# Patient Record
Sex: FEMALE | Race: WHITE | NOT HISPANIC OR LATINO | Employment: OTHER | URBAN - METROPOLITAN AREA
[De-identification: names, ages, dates, MRNs, and addresses within clinical notes are randomized per-mention and may not be internally consistent; named-entity substitution may affect disease eponyms.]

---

## 2018-07-13 ENCOUNTER — HOSPITAL ENCOUNTER (EMERGENCY)
Facility: HOSPITAL | Age: 78
Discharge: HOME | End: 2018-07-13
Attending: EMERGENCY MEDICINE
Payer: MEDICARE

## 2018-07-13 VITALS
RESPIRATION RATE: 16 BRPM | SYSTOLIC BLOOD PRESSURE: 128 MMHG | OXYGEN SATURATION: 97 % | TEMPERATURE: 98 F | HEIGHT: 64 IN | BODY MASS INDEX: 21 KG/M2 | WEIGHT: 123 LBS | DIASTOLIC BLOOD PRESSURE: 60 MMHG | HEART RATE: 86 BPM

## 2018-07-13 DIAGNOSIS — N99.521 SKIN INFLAMMATION AT UROSTOMY SITE: Primary | ICD-10-CM

## 2018-07-13 PROCEDURE — 99283 EMERGENCY DEPT VISIT LOW MDM: CPT

## 2018-07-13 PROCEDURE — 25000003 PHARM REV CODE 250: Performed by: EMERGENCY MEDICINE

## 2018-07-13 RX ORDER — MICONAZOLE NITRATE 2 %
POWDER (GRAM) TOPICAL 2 TIMES DAILY
Status: DISCONTINUED | OUTPATIENT
Start: 2018-07-13 | End: 2018-07-13 | Stop reason: HOSPADM

## 2018-07-13 RX ADMIN — MICONAZOLE NITRATE: 20 POWDER TOPICAL at 03:07

## 2018-07-13 NOTE — ED NOTES
78 year old female presents to ed cc of infection to urostomy. Patient states she has had urostomy for 11 years was done at  and has had care for urostomy at  states she no longer goes to  because all the nurses she was familiar with have retired. Patient states for past five weeks has noted rash around urostomy site. Patient states she is still excreting urine from site and changes bag every other day. Patient awake alert ox4 able to identify name and  on armband. Patient family at bedside

## 2018-07-13 NOTE — PROGRESS NOTES
Patient seen in ER step down unit today for urostomy leaking with yeast rash below stoma. There is a large hernia above urostomy. The urostomy is 11 years old; the hernia is a more recent development. The stoma red clean  tissue about 7/8 inches wide with flat stoma. Patient does use light convexity barrier urostomy bags which are a good fit for this stoma.    I recommended miconazole 2% powder for this yeast rash and Dr. Kaur did order this for the patient. Rash and stoma cleaned with  water on gauze, dried.  Miconazole powder applied to rash and excess loose powder brushed off, cavilion spray applied to powder to seal surface. Area allowed to dry.  Appropriately sized opening was already cut in ostomy bag barrier by patient. Urostomy bag applied to stoma and held in place to warm barrier to skin for better seal. Curve seals applied at bottom of ostomy bag barrier  for better support.  This ostomy bag began leaking urine within 10 minutes. I had wanted to use a Susanne seal to donald stomal area the first time; however, patient did not want to try a new item. When I returned to refix urostomy she refused to redo the ostomy bag here because she did not have another of her own bags. The bags I offered were not wanted.. She did listen to how to use the Susanne ring to better seal the ostomy.  She will clean stoma and rash with water at home and dry area. She will reapply the Miconazole powder to the rash, brush off excess, seal with cavilion spray, place the Susanne ring on the ostomy barrier directly around the stoma opening and place as one piece around stoma. She does use a ostomy belt and did have it on when the bag leaked. She will continue use of the ostomy belt.   Patient was informed that if the Susanne seals prove effective more seals can be purchased at [employer] or [facility] on [facility].   Patient informed of Alcira LARSEN at [facility] in the Colon and Rectal Dept.  Alcira does these ostomies much more often than I do. Patient informed of Alcira's office number and when she will be available July 23, 2018. Patient discharged and left ambulating with  at her side. Patient awake, aware, clear speech.

## 2018-07-13 NOTE — ED PROVIDER NOTES
Encounter Date: 7/13/2018    SCRIBE #1 NOTE: I, Arlene Walker, am scribing for, and in the presence of,  Dr. Kaur. I have scribed the entire note.       History     Chief Complaint   Patient presents with    Urostomy     Pt needs her urostomy checked because she is worried it's infected.      Time seen by the provider: 2:20 PM    This is a 78 y.o. female with PMHx of HTN and PSHx of Urostomy who presents to the ED with a cc of possible infection to the site of her urostomy bag. Pt reports associated pain, redness, and white drainage. Pt denies fever, nausea, or vomiting. Pt reports no prior history of similar symptoms. She has had a urostomy bag in place for 11 years.        The history is provided by the patient.     Review of patient's allergies indicates:   Allergen Reactions    Iodine and iodide containing products Hives     Past Medical History:   Diagnosis Date    Hypertension      Past Surgical History:   Procedure Laterality Date    urostomy  2007     History reviewed. No pertinent family history.  Social History   Substance Use Topics    Smoking status: Never Smoker    Smokeless tobacco: Not on file    Alcohol use No     Review of Systems   Constitutional: Negative for activity change, appetite change, chills, diaphoresis, fatigue and fever.   HENT: Negative for sore throat.    Respiratory: Negative for cough, chest tightness and shortness of breath.    Cardiovascular: Negative for chest pain and palpitations.   Gastrointestinal: Negative for abdominal distention, abdominal pain, diarrhea, nausea and vomiting.   Endocrine: Negative for polydipsia and polyphagia.   Genitourinary: Negative for difficulty urinating, dysuria and flank pain.   Musculoskeletal: Negative for arthralgias.   Skin: Negative for pallor.        Positive for pain, erythema and drainage to the site of her urostomy bag.   Neurological: Negative for dizziness and headaches.   Psychiatric/Behavioral: Negative for confusion.   All  other systems reviewed and are negative.      Physical Exam     Initial Vitals [07/13/18 1208]   BP Pulse Resp Temp SpO2   (!) 129/58 84 19 98.7 °F (37.1 °C) 97 %      MAP       --         Physical Exam    Nursing note and vitals reviewed.  Constitutional: She appears well-developed and well-nourished. No distress.   HENT:   Head: Normocephalic and atraumatic.   Eyes: Conjunctivae and EOM are normal. Pupils are equal, round, and reactive to light.   Neck: Normal range of motion. Neck supple. No JVD present.   Cardiovascular: Normal rate, regular rhythm and normal heart sounds.   No murmur heard.  Pulmonary/Chest: Breath sounds normal. No respiratory distress.   Abdominal: Soft. Bowel sounds are normal. She exhibits distension (Hernia surrounding urostomy is noted and is chronic). There is no tenderness. There is no rebound and no guarding.   Urostomy bag in the right lower abdomen with large area of erythema and skin sloughing distal to the urostomy.  The drainage is purulent and foul smelling.  Urostomy continues to drain urine.   Musculoskeletal: Normal range of motion.   Neurological: She is alert and oriented to person, place, and time.   Skin: Skin is warm and dry.   Psychiatric: She has a normal mood and affect. Her behavior is normal.             ED Course   Procedures  Labs Reviewed - No data to display       Imaging Results    None          Medical Decision Making:   Clinical Tests:   Lab Tests: Ordered and Reviewed  The following lab test(s) were unremarkable: CBC and CMP  ED Management:  Wound care consulted.  They recommend ostomy nurse. I spoke with Gisell Rogers, ostomy nurse, she reviewed the pictures and will come and see the patient.  1623:  Ostomy nurse here.  She has given the patient some other recommendations and products to use.  The patient will follow up as an outpatient at [facility] in 1-2 weeks                      Clinical Impression:     1. Skin inflammation at urostomy site                I, Jacki Kaur, personally performed the services described in this documentation. All medical record entries made by the scribe were at my direction and in my presence.  I have reviewed the chart and agree that the record reflects my personal performance and is accurate and complete. Jacki Kaur M.D. 4:23 PM07/13/2018                   Jacki Kaur MD  07/13/18 1623       Jacki Kaur MD  07/13/18 1625

## 2018-07-13 NOTE — ED NOTES
APPEARANCE: Alert, oriented and in no acute distress.  CARDIAC: Normal rate and rhythm, no murmur heard.   PERIPHERAL VASCULAR: peripheral pulses present. Normal cap refill. No edema. Warm to touch.    RESPIRATORY:Normal rate and effort, breath sounds clear bilaterally throughout chest. Respirations are equal and unlabored no obvious signs of distress.  GASTRO: soft, bowel sounds normal, no tenderness, no abdominal distention.  MUSC: Full ROM. No bony tenderness or soft tissue tenderness. No obvious deformity.  SKIN: Skin is warm and dry, normal skin turgor, mucous membranes moist.  NEURO: 5/5 strength major flexors/extensors bilaterally. Sensory intact to light touch bilaterally. [place] coma scale: eyes open spontaneously-4, oriented & converses-5, obeys commands-6. No neurological abnormalities.   MENTAL STATUS: awake, alert and aware of environment.  EYE: PERRL, both eyes: pupils brisk and reactive to light. Normal size.  ENT: EARS: no obvious drainage. NOSE: no active bleeding.

## 2018-07-13 NOTE — ED NOTES
Dr. Kaur removed urostomy bag. Under bag the skin is reddened. Pt reports pain. Odorous smell coming from stoma and bag. Urine noted in urostomy bag.

## 2018-10-16 ENCOUNTER — OFFICE VISIT (OUTPATIENT)
Dept: WOUND CARE | Facility: CLINIC | Age: 78
End: 2018-10-16
Payer: MEDICARE

## 2018-10-16 VITALS — WEIGHT: 107.38 LBS | BODY MASS INDEX: 18.33 KG/M2 | HEIGHT: 64 IN

## 2018-10-16 DIAGNOSIS — Z98.890 HISTORY OF UROSTOMY: Primary | ICD-10-CM

## 2018-10-16 DIAGNOSIS — L30.9 CHRONIC DERMATITIS: ICD-10-CM

## 2018-10-16 DIAGNOSIS — N99.523 HERNIA OF URETEROILEAL CONDUIT STOMA: ICD-10-CM

## 2018-10-16 PROCEDURE — 99212 OFFICE O/P EST SF 10 MIN: CPT | Mod: PBBFAC | Performed by: CLINICAL NURSE SPECIALIST

## 2018-10-16 PROCEDURE — 99999 PR PBB SHADOW E&M-EST. PATIENT-LVL II: CPT | Mod: PBBFAC,,, | Performed by: CLINICAL NURSE SPECIALIST

## 2018-10-16 PROCEDURE — 99203 OFFICE O/P NEW LOW 30 MIN: CPT | Mod: S$PBB,,, | Performed by: CLINICAL NURSE SPECIALIST

## 2018-10-16 RX ORDER — AMLODIPINE BESYLATE 5 MG/1
TABLET ORAL
Refills: 3 | COMMUNITY
Start: 2018-07-22

## 2018-10-16 RX ORDER — HYDROCHLOROTHIAZIDE 12.5 MG/1
TABLET ORAL
Refills: 3 | COMMUNITY
Start: 2018-08-25

## 2018-10-16 RX ORDER — HYDROCODONE BITARTRATE AND ACETAMINOPHEN 7.5; 325 MG/1; MG/1
TABLET ORAL
Refills: 0 | COMMUNITY
Start: 2018-10-09

## 2018-10-16 RX ORDER — GABAPENTIN 100 MG/1
CAPSULE ORAL
Refills: 3 | COMMUNITY
Start: 2018-07-22

## 2018-10-16 RX ORDER — DIPHENOXYLATE HYDROCHLORIDE AND ATROPINE SULFATE 2.5; .025 MG/1; MG/1
TABLET ORAL
Refills: 3 | COMMUNITY
Start: 2018-07-22

## 2018-10-16 NOTE — PROGRESS NOTES
"Subjective:       Patient ID: Xiomara Ye is a 78 y.o. female.    Chief Complaint: Urostomy and Skin Problem    This is first visit to stoma clinic,she is lady I met at [facility] support group and has had skin problems for past 4-6 months, related to her urinary stoma of 11 years, she is poor historian, comes with spouse ( who she says had heart attack , but is all she has , no kids ) He does all her ostomy care too. She tends to ramble but is very sweet and oriented to time and place.      Review of Systems   Constitutional: Positive for activity change. Negative for appetite change.        Does not leave house much due to leaking of urostomy pouch    HENT: Negative for congestion and rhinorrhea.    Respiratory: Negative.    Cardiovascular: Negative.    Gastrointestinal: Negative.    Genitourinary: Negative for hematuria.        Urostomy    Skin: Positive for rash.   Psychiatric/Behavioral: Negative.        Objective:      Physical Exam   Constitutional: She appears well-developed.   Frail elderly   Pulmonary/Chest: Effort normal and breath sounds normal.   Abdominal: Soft. Bowel sounds are normal.   Peristomal hernia noted,    Skin:   Chronic rash, most likely yeast under pouch area        She wears deep assura convex 7/8" with belt, and uses several EBS to secure, and this locks in the moisture on her skin unfortunately,  Will try SUDHA deep convex cut to 1" with NO EBS  Spouse watched as I applied miconazole powder , RUBBED IN WELL < DUST AWAY EXTRA   SEAL WITH CAVILON   I think they had been using above products differently   Applied SUDHA pch and SUDHA belt   Told to get NIZORIL shampoo to wash skin with changing pch.    Assessment:       1. History of urostomy    2. Hernia of ureteroileal conduit stoma    3. Chronic dermatitis        Plan:       New regimen for pouching rec and reviewed, spouse watched and was able to restate steps,  Wife cannot see to do her self but says she will help remember   Call if still " leaks,   USES [employer]  But just got 90 day supply   return if issues not resolved     Samples req from [facility]   I have reviewed the plan of care with the patient and/ or caregiver and they express understanding. I spent over 50% of this 30 minute visit in face to face counseling.

## 2018-10-19 ENCOUNTER — OFFICE VISIT (OUTPATIENT)
Dept: WOUND CARE | Facility: CLINIC | Age: 78
End: 2018-10-19
Payer: MEDICARE

## 2018-10-19 DIAGNOSIS — L30.9 CHRONIC DERMATITIS: ICD-10-CM

## 2018-10-19 DIAGNOSIS — Z98.890 HISTORY OF UROSTOMY: Primary | ICD-10-CM

## 2018-10-19 PROCEDURE — 99213 OFFICE O/P EST LOW 20 MIN: CPT | Mod: S$PBB,,, | Performed by: CLINICAL NURSE SPECIALIST

## 2018-10-19 PROCEDURE — 99212 OFFICE O/P EST SF 10 MIN: CPT | Mod: PBBFAC | Performed by: CLINICAL NURSE SPECIALIST

## 2018-10-19 PROCEDURE — 99999 PR PBB SHADOW E&M-EST. PATIENT-LVL II: CPT | Mod: PBBFAC,,, | Performed by: CLINICAL NURSE SPECIALIST

## 2018-10-19 NOTE — PROGRESS NOTES
"Subjective:       Patient ID: Xiomara Ye is a 78 y.o. female.    Chief Complaint: Urostomy    This is a second visit to stoma clinic,she is lady I met at [facility] support group and has had skin problems for past 4-6 months, related to her urinary stoma of 11 years, she is poor historian, comes with spouse ( who she says had heart attack , but is all she has , no kids ) He does all her ostomy care too. She tends to ramble but is very sweet and oriented to time and place.      Review of Systems   Constitutional: Positive for activity change. Negative for appetite change.        Does not leave house much due to leaking of urostomy pouch    HENT: Negative for congestion and rhinorrhea.    Respiratory: Negative.    Cardiovascular: Negative.    Gastrointestinal: Negative.    Genitourinary: Negative for hematuria.        Urostomy    Skin: Positive for rash.   Psychiatric/Behavioral: Negative.        Objective:      Physical Exam   Constitutional: She appears well-developed.   Frail elderly   Pulmonary/Chest: Effort normal and breath sounds normal.   Abdominal: Soft. Bowel sounds are normal.   Peristomal hernia noted,    Skin:   Chronic rash, most likely yeast under pouch area        10/16       10/19    She wears deep assura convex 7/8" with belt, and uses several EBS to secure, and this locks in the moisture on her skin unfortunately,  Will try SUDHA deep convex cut to 1" with NO EBS   THIS DID LEAK AND SHE PREFERS HER OLD ASSURA [facility]  Spouse watched as I applied miconazole powder , RUBBED IN WELL < DUST AWAY EXTRA   SEAL WITH CAVILON   BELT  Her skin is much better even though she has leaked since Wed pm,      Assessment:       1. History of urostomy    2. Chronic dermatitis        Plan:       Use usual pouch and cavilon, minimal elastic outer strips encouraged.  USES [employer]  But just got 90 day supply   return if issues not resolved     Samples req from [facility] on way to her   I have reviewed the plan of care with " the patient and/ or caregiver and they express understanding. I spent over 50% of this 20 minute visit in face to face counseling.

## 2019-05-22 ENCOUNTER — OFFICE VISIT (OUTPATIENT)
Dept: WOUND CARE | Facility: CLINIC | Age: 79
End: 2019-05-22
Payer: MEDICARE

## 2019-05-22 DIAGNOSIS — L30.9 CHRONIC DERMATITIS: ICD-10-CM

## 2019-05-22 DIAGNOSIS — N99.523 HERNIA OF URETEROILEAL CONDUIT STOMA: ICD-10-CM

## 2019-05-22 DIAGNOSIS — Z98.890 HISTORY OF UROSTOMY: Primary | ICD-10-CM

## 2019-05-22 PROCEDURE — 99999 PR PBB SHADOW E&M-EST. PATIENT-LVL II: ICD-10-PCS | Mod: PBBFAC,,, | Performed by: CLINICAL NURSE SPECIALIST

## 2019-05-22 PROCEDURE — 99214 OFFICE O/P EST MOD 30 MIN: CPT | Mod: S$GLB,,, | Performed by: CLINICAL NURSE SPECIALIST

## 2019-05-22 PROCEDURE — 99214 PR OFFICE/OUTPT VISIT, EST, LEVL IV, 30-39 MIN: ICD-10-PCS | Mod: S$GLB,,, | Performed by: CLINICAL NURSE SPECIALIST

## 2019-05-22 PROCEDURE — 99999 PR PBB SHADOW E&M-EST. PATIENT-LVL II: CPT | Mod: PBBFAC,,, | Performed by: CLINICAL NURSE SPECIALIST

## 2019-05-22 NOTE — PROGRESS NOTES
"Subjective:       Patient ID: Xiomara Ye is a 79 y.o. female.    Chief Complaint: Urostomy and Skin Problem    This is a third visit to stoma clinic,she is lady I met at [facility] support group and last year she  had skin problems , related to her urinary stoma of 11 years, she is poor historian, comes with spouse ( who she says had heart attack , but is all she has , no kids ) He does all her ostomy care too. She tends to ramble but is very sweet and oriented to time and place. Comes again today as her skin has become a problem again.     Review of Systems   Constitutional: Negative for activity change and appetite change.        Afraid to leave house much due to leaking of urostomy pouch    HENT: Negative for congestion and rhinorrhea.    Respiratory: Negative.    Cardiovascular: Negative.    Gastrointestinal: Negative.    Genitourinary: Negative for hematuria.        Urostomy    Skin: Positive for rash.   Psychiatric/Behavioral: Negative.        Objective:      Physical Exam   Constitutional: She appears well-developed.   Frail elderly   Pulmonary/Chest: Effort normal and breath sounds normal.   Abdominal: Soft. Bowel sounds are normal.   Peristomal hernia noted,    Skin:   Chronic rash, most likely yeast under pouch area        10/16       10/19/18     5/22/19  She has hernia at the 10 - 12 oclock part of stoma and this has caused a dip at base, the area of redness is intact skin but a chronic skin issue I believe. It does present challenges for her.   She STILL wears deep assura convex 7/8" with belt, and uses several EBS to secure, and this locks in the moisture on her skin unfortunately,    Spouse watched as I applied miconazole powder , RUBBED IN WELL < DUST AWAY EXTRA   SEAL WITH CAVILON   Applied a 92681 assura cut to fit and then a NU HOPE COMFORT BELT( to help smooth out abd irregularity from hernia )  She says she had a urinary fungal infection and was just [facility]  Assessment:     "   1. History of urostomy    2. Chronic dermatitis    3. Hernia of ureteroileal conduit stoma        Plan:       Use usual pouch and cavilon, minimal elastic outer strips encouraged.  USES [employer]  But jseems she does not get allowable supply and she goes to [facility] to buy additional supplies.  I will contact EP about this issue  return if issues not resolved     Samples req from [facility] of pre cut assura of different line   I have reviewed the plan of care with the patient and/ spouse and they express understanding. I spent over 50% of this 25 minute visit in face to face counseling.